# Patient Record
Sex: MALE | Race: WHITE | NOT HISPANIC OR LATINO | URBAN - METROPOLITAN AREA
[De-identification: names, ages, dates, MRNs, and addresses within clinical notes are randomized per-mention and may not be internally consistent; named-entity substitution may affect disease eponyms.]

---

## 2021-01-01 ENCOUNTER — OFFICE VISIT (OUTPATIENT)
Dept: URBAN - METROPOLITAN AREA CLINIC 76 | Facility: CLINIC | Age: 86
End: 2021-01-01
Payer: COMMERCIAL

## 2021-01-01 ENCOUNTER — SURGERY (OUTPATIENT)
Dept: URBAN - METROPOLITAN AREA SURGERY 11 | Facility: SURGERY | Age: 86
End: 2021-01-01
Payer: COMMERCIAL

## 2021-01-01 ENCOUNTER — OFFICE VISIT (OUTPATIENT)
Dept: URBAN - METROPOLITAN AREA CLINIC 71 | Facility: CLINIC | Age: 86
End: 2021-01-01
Payer: COMMERCIAL

## 2021-01-01 ENCOUNTER — OFFICE VISIT (OUTPATIENT)
Dept: URBAN - METROPOLITAN AREA CLINIC 28 | Facility: CLINIC | Age: 86
End: 2021-01-01
Payer: COMMERCIAL

## 2021-01-01 DIAGNOSIS — H25.13 NUCLEAR SCLEROSIS CATARACT, BILATERAL: ICD-10-CM

## 2021-01-01 DIAGNOSIS — H40.023 OPEN ANGLE WITH BORDERLINE FINDINGS, HIGH RISK, BILATERAL: Primary | ICD-10-CM

## 2021-01-01 DIAGNOSIS — H40.1132 PRIMARY OPEN-ANGLE GLAUCOMA, BILATERAL, MODERATE STAGE: Primary | ICD-10-CM

## 2021-01-01 PROCEDURE — 99204 OFFICE O/P NEW MOD 45 MIN: CPT | Performed by: OPHTHALMOLOGY

## 2021-01-01 PROCEDURE — 76514 ECHO EXAM OF EYE THICKNESS: CPT | Performed by: OPHTHALMOLOGY

## 2021-01-01 PROCEDURE — 92083 EXTENDED VISUAL FIELD XM: CPT | Performed by: OPTOMETRIST

## 2021-01-01 PROCEDURE — 99214 OFFICE O/P EST MOD 30 MIN: CPT | Performed by: OPTOMETRIST

## 2021-01-01 PROCEDURE — 92020 GONIOSCOPY: CPT | Performed by: OPHTHALMOLOGY

## 2021-01-01 PROCEDURE — 99204 OFFICE O/P NEW MOD 45 MIN: CPT | Performed by: OPTOMETRIST

## 2021-01-01 PROCEDURE — 92004 COMPRE OPH EXAM NEW PT 1/>: CPT | Performed by: OPHTHALMOLOGY

## 2021-01-01 PROCEDURE — 92133 CPTRZD OPH DX IMG PST SGM ON: CPT | Performed by: OPHTHALMOLOGY

## 2021-01-01 RX ORDER — BRIMONIDINE TARTRATE 2 MG/ML
0.2 % SOLUTION/ DROPS OPHTHALMIC
Qty: 15 | Refills: 1 | Status: INACTIVE
Start: 2021-01-01 | End: 2021-01-01

## 2021-01-01 RX ORDER — ACETAZOLAMIDE 250 MG/1
250 MG TABLET ORAL
Qty: 60 | Refills: 1 | Status: ACTIVE
Start: 2021-01-01

## 2021-01-01 RX ORDER — BRIMONIDINE TARTRATE 2 MG/ML
0.2 % SOLUTION/ DROPS OPHTHALMIC
Qty: 15 | Refills: 1 | Status: ACTIVE
Start: 2021-01-01

## 2021-01-01 RX ORDER — TRAVOPROST 0.04 MG/ML
0.004 % SOLUTION/ DROPS OPHTHALMIC
Qty: 7.5 | Refills: 1 | Status: INACTIVE
Start: 2021-01-01 | End: 2021-01-01

## 2021-01-01 ASSESSMENT — INTRAOCULAR PRESSURE
OS: 28
OD: 16
OS: 15
OD: 30
OS: 25
OD: 28
OD: 20
OS: 23
OS: 18
OD: 25

## 2021-01-01 ASSESSMENT — KERATOMETRY
OD: 45.25
OS: 44.00

## 2021-07-26 NOTE — IMPRESSION/PLAN
Impression: Nuclear sclerosis cataract, bilateral: H25.13. Pt was not dilated today Plan: Continue to monitor.

## 2021-07-26 NOTE — IMPRESSION/PLAN
Impression: Open angle with borderline findings, high risk, bilateral: H40.023. s/p SLT OU. IOP much too high OU. Target: mid-low teens. Pt has not been compliant with drops or follow-up visits. Plan: Continue Azopt BID OU, Timolol BID OU, Brimonidine BID OU, and restart Travatan QHS OU. Rx sent to pharmacy. Restarting Travatan not expected to provide adequate control. Due to uncontrolled pressure OU, refer to Glaucoma specialist next available for further evaluation and possible treatment. Schedule DE/VF/OCT/IOP check within 1 month with Dr. Laci Virk.

## 2021-08-03 NOTE — IMPRESSION/PLAN
Impression: Open angle with borderline findings, high risk, bilateral: H40.023. s/p SLT OU. Target: mid-low teens. IOP improved OU today, better compliance. Plan: Discussed condition. Continue Azopt BID OU, Timolol BID OU, Brimonidine BID OU, and Travatan QHS OU. Emphasized compliance. No additional treatment needed at this time. Keep scheduled appt with Dr. Veronika Stack.

## 2021-10-21 NOTE — IMPRESSION/PLAN
Impression: Primary open-angle glaucoma, bilateral, moderate stage: G32.6511. IOP control too inconsistent with drop use. IOP too high today. Dr. Johnson Boys 8/3/21 advised no addtl tx but IOP was better then. Pt reports good compliance but IOP still too high. Not dilated due to risk of raising IOP further. OCT RNFL unobtainable. VF 10/21/21: severe constriction OD, advanced constriction OS. Plan: Discussed vision loss and likelihood of further loss without proper control. Refer back go glaucoma specialist for addtl tx ASAP. Continue Azopt BID OU, Timolol BID OU, Brimonidine BID OU, and Travatan QHS OU. Begin acetazolamide 250 mg BID PO. Gave 2 weeks of samples. Warned of side effects. May consider cataract surgery as part of tx for glaucoma. Call if worsens.

## 2021-11-02 NOTE — IMPRESSION/PLAN
Impression: Primary open-angle glaucoma, bilateral, moderate stage: H08.9942. Plan: Continue Azopt BID OU, Timolol BID OU, Brimonidine BID OU, Travatan QHS OU and acetazolamide 250 mg BID. Discussed surgery vs laser. Patient elects SLT OU. Recommend Trabeculoplasty (SLT)  to possibly lower IOP. Discussed RBA's of laser trabeculoplasty .  RL=2.

## 2021-12-17 NOTE — IMPRESSION/PLAN
Impression: Primary open-angle glaucoma, bilateral, moderate stage: L65.9327. IOP control too inconsistent with drop use. IOP too high today. Dr. Sheryl Martínez 8/3/21 advised no addtl tx but IOP was better then. Pt reports good compliance but IOP still too high. Not dilated due to risk of raising IOP further. OCT RNFL unobtainable. VF 10/21/21: severe constriction OD, advanced constriction OS. Plan: Discussed vision loss and likelihood of further loss without proper control. Due to uncontrolled IOPs today, refer back go glaucoma specialist for addtl tx ASAP. Continue Azopt BID OU, Timolol BID OU, Brimonidine BID OU, and Travatan QHS OU. May consider cataract surgery as part of tx for glaucoma. Call if worsens.